# Patient Record
Sex: MALE | Race: WHITE | URBAN - NONMETROPOLITAN AREA
[De-identification: names, ages, dates, MRNs, and addresses within clinical notes are randomized per-mention and may not be internally consistent; named-entity substitution may affect disease eponyms.]

---

## 2019-01-21 NOTE — PATIENT DISCUSSION
[facility]2019[phone]&nbsp;SN &nbsp; &nbsp; cindy

## 2020-12-11 ENCOUNTER — IMPORTED ENCOUNTER (OUTPATIENT)
Dept: URBAN - NONMETROPOLITAN AREA CLINIC 1 | Facility: CLINIC | Age: 65
End: 2020-12-11

## 2020-12-11 PROBLEM — H52.02: Noted: 2020-12-11

## 2020-12-11 PROBLEM — H25.813: Noted: 2020-12-11

## 2020-12-11 PROBLEM — E11.9: Noted: 2020-12-11

## 2020-12-11 PROBLEM — H52.223: Noted: 2020-12-11

## 2020-12-11 PROBLEM — H52.4: Noted: 2020-12-11

## 2020-12-11 PROCEDURE — 92004 COMPRE OPH EXAM NEW PT 1/>: CPT

## 2020-12-11 PROCEDURE — 92015 DETERMINE REFRACTIVE STATE: CPT

## 2020-12-11 NOTE — PATIENT DISCUSSION
DM s DR-Stressed the importance of keeping blood sugars under control blood pressure under control and weight normalization and regular visits with PCP. -Explained the possible effects of poorly controlled diabetes and the damage that diabetes can cause to ocular health. -Patient to check HgbA1C.-Pt instructed to contact our office with any vision changes. Cataract OU-Reviewed symptoms of advancing cataract growth such as glare and halos and decreased vision.-Continue to monitor for now. Pt will notify us if any new symptoms develop. Hyperopia-Discussed diagnosis with patient. No Rx dispensed today RTC 1 yr CEE Letter Dayna Roman 12/11/20

## 2022-04-09 ASSESSMENT — TONOMETRY
OS_IOP_MMHG: 16
OD_IOP_MMHG: 15

## 2022-04-09 ASSESSMENT — VISUAL ACUITY
OS_CC: 20/30
OD_CC: 20/30

## 2022-08-31 ENCOUNTER — COMPREHENSIVE EXAM (OUTPATIENT)
Dept: URBAN - NONMETROPOLITAN AREA CLINIC 4 | Facility: CLINIC | Age: 67
End: 2022-08-31

## 2022-08-31 DIAGNOSIS — H52.4: ICD-10-CM

## 2022-08-31 DIAGNOSIS — H52.02: ICD-10-CM

## 2022-08-31 DIAGNOSIS — H52.223: ICD-10-CM

## 2022-08-31 PROCEDURE — 92015 DETERMINE REFRACTIVE STATE: CPT

## 2022-08-31 PROCEDURE — 92014 COMPRE OPH EXAM EST PT 1/>: CPT

## 2022-08-31 ASSESSMENT — VISUAL ACUITY
OS_SC: 20/40-1
OD_SC: 20/30+2

## 2022-08-31 ASSESSMENT — TONOMETRY
OS_IOP_MMHG: 14
OD_IOP_MMHG: 14

## 2022-08-31 NOTE — PATIENT DISCUSSION
DM s DR-Stressed the importance of keeping blood sugars under control blood pressure under control and weight normalization and regular visits with PCP. -Explained the possible effects of poorly controlled diabetes and the damage that diabetes can cause to ocular health. -Patient to check HgbA1C.-Pt instructed to contact our office with any vision changes. Cataract OU-Reviewed symptoms of advancing cataract growth such as glare and halos and decreased vision.-Continue to monitor for now. Pt will notify us if any new symptoms develop. Hyperopia-Discussed diagnosis with patient. No Rx dispensed today RTC 1 yr CEE Letter Morales Cantor 12/11/20.

## 2023-10-16 ENCOUNTER — COMPREHENSIVE EXAM (OUTPATIENT)
Dept: URBAN - NONMETROPOLITAN AREA CLINIC 4 | Facility: CLINIC | Age: 68
End: 2023-10-16

## 2023-10-16 DIAGNOSIS — H52.4: ICD-10-CM

## 2023-10-16 DIAGNOSIS — E11.9: ICD-10-CM

## 2023-10-16 DIAGNOSIS — H52.02: ICD-10-CM

## 2023-10-16 DIAGNOSIS — H52.223: ICD-10-CM

## 2023-10-16 DIAGNOSIS — H25.813: ICD-10-CM

## 2023-10-16 PROCEDURE — 99214 OFFICE O/P EST MOD 30 MIN: CPT

## 2023-10-16 ASSESSMENT — TONOMETRY
OS_IOP_MMHG: 14
OD_IOP_MMHG: 14

## 2023-10-16 ASSESSMENT — VISUAL ACUITY
OS_SC: 20/20-2
OD_SC: 20/25+1

## 2024-10-22 ENCOUNTER — COMPREHENSIVE EXAM (OUTPATIENT)
Dept: URBAN - NONMETROPOLITAN AREA CLINIC 4 | Facility: CLINIC | Age: 69
End: 2024-10-22

## 2024-10-22 DIAGNOSIS — H52.4: ICD-10-CM

## 2024-10-22 DIAGNOSIS — H25.813: ICD-10-CM

## 2024-10-22 DIAGNOSIS — H52.223: ICD-10-CM

## 2024-10-22 DIAGNOSIS — H52.02: ICD-10-CM

## 2024-10-22 DIAGNOSIS — E11.9: ICD-10-CM

## 2024-10-22 PROCEDURE — 92014 COMPRE OPH EXAM EST PT 1/>: CPT
